# Patient Record
Sex: MALE | Race: BLACK OR AFRICAN AMERICAN | NOT HISPANIC OR LATINO | URBAN - METROPOLITAN AREA
[De-identification: names, ages, dates, MRNs, and addresses within clinical notes are randomized per-mention and may not be internally consistent; named-entity substitution may affect disease eponyms.]

---

## 2019-12-01 ENCOUNTER — EMERGENCY (EMERGENCY)
Facility: HOSPITAL | Age: 49
LOS: 1 days | Discharge: HOME | End: 2019-12-01
Admitting: EMERGENCY MEDICINE
Payer: MEDICAID

## 2019-12-01 VITALS
TEMPERATURE: 98 F | HEART RATE: 96 BPM | DIASTOLIC BLOOD PRESSURE: 78 MMHG | RESPIRATION RATE: 17 BRPM | SYSTOLIC BLOOD PRESSURE: 137 MMHG | WEIGHT: 165.35 LBS | OXYGEN SATURATION: 98 % | HEIGHT: 69 IN

## 2019-12-01 VITALS
TEMPERATURE: 99 F | SYSTOLIC BLOOD PRESSURE: 137 MMHG | RESPIRATION RATE: 16 BRPM | HEART RATE: 97 BPM | DIASTOLIC BLOOD PRESSURE: 81 MMHG | OXYGEN SATURATION: 100 %

## 2019-12-01 DIAGNOSIS — Z90.49 ACQUIRED ABSENCE OF OTHER SPECIFIED PARTS OF DIGESTIVE TRACT: Chronic | ICD-10-CM

## 2019-12-01 LAB
APPEARANCE UR: SIGNIFICANT CHANGE UP
BILIRUB UR-MCNC: NEGATIVE — SIGNIFICANT CHANGE UP
COLOR SPEC: YELLOW — SIGNIFICANT CHANGE UP
DIFF PNL FLD: ABNORMAL
GLUCOSE UR QL: NEGATIVE — SIGNIFICANT CHANGE UP
KETONES UR-MCNC: NEGATIVE — SIGNIFICANT CHANGE UP
LEUKOCYTE ESTERASE UR-ACNC: ABNORMAL
NITRITE UR-MCNC: POSITIVE
PH UR: 6 — SIGNIFICANT CHANGE UP (ref 5–8)
PROT UR-MCNC: ABNORMAL MG/DL
SP GR SPEC: 1.02 — SIGNIFICANT CHANGE UP (ref 1–1.03)
UROBILINOGEN FLD QL: 0.2 E.U./DL — SIGNIFICANT CHANGE UP

## 2019-12-01 PROCEDURE — 99284 EMERGENCY DEPT VISIT MOD MDM: CPT

## 2019-12-01 PROCEDURE — 76870 US EXAM SCROTUM: CPT | Mod: 26

## 2019-12-01 RX ORDER — AZITHROMYCIN 500 MG/1
1000 TABLET, FILM COATED ORAL ONCE
Refills: 0 | Status: COMPLETED | OUTPATIENT
Start: 2019-12-01 | End: 2019-12-01

## 2019-12-01 RX ORDER — CEFPODOXIME PROXETIL 100 MG
1 TABLET ORAL
Qty: 14 | Refills: 0
Start: 2019-12-01 | End: 2019-12-07

## 2019-12-01 RX ORDER — ACETAMINOPHEN 500 MG
650 TABLET ORAL ONCE
Refills: 0 | Status: COMPLETED | OUTPATIENT
Start: 2019-12-01 | End: 2019-12-01

## 2019-12-01 RX ORDER — CEFTRIAXONE 500 MG/1
250 INJECTION, POWDER, FOR SOLUTION INTRAMUSCULAR; INTRAVENOUS ONCE
Refills: 0 | Status: COMPLETED | OUTPATIENT
Start: 2019-12-01 | End: 2019-12-01

## 2019-12-01 RX ADMIN — AZITHROMYCIN 1000 MILLIGRAM(S): 500 TABLET, FILM COATED ORAL at 16:43

## 2019-12-01 RX ADMIN — CEFTRIAXONE 250 MILLIGRAM(S): 500 INJECTION, POWDER, FOR SOLUTION INTRAMUSCULAR; INTRAVENOUS at 16:44

## 2019-12-01 RX ADMIN — Medication 650 MILLIGRAM(S): at 16:43

## 2019-12-01 NOTE — ED ADULT NURSE NOTE - NSIMPLEMENTINTERV_GEN_ALL_ED
Implemented All Universal Safety Interventions:  [place] to call system. Call bell, personal items and telephone within reach. Instruct patient to call for assistance. Room bathroom lighting operational. Non-slip footwear when patient is off stretcher. Physically safe environment: no spills, clutter or unnecessary equipment. Stretcher in lowest position, wheels locked, appropriate side rails in place.

## 2019-12-01 NOTE — ED PROVIDER NOTE - CLINICAL SUMMARY MEDICAL DECISION MAKING FREE TEXT BOX
Pt with R testicular pain and swelling. Will do US, check urine, check for STD and treat for Gonorrhea and Chlamydia.

## 2019-12-01 NOTE — ED ADULT TRIAGE NOTE - CHIEF COMPLAINT QUOTE
patient here with right testicular swelling x3 days, denies discharge or painful urination; also complaining of posterior headaches x1 week; hx HTN and non-compliant with meds

## 2019-12-01 NOTE — ED PROVIDER NOTE - OBJECTIVE STATEMENT
50 y/o M with a PMHx of HTN (on Lisinopril, non-compliant), HIV (on Tivicay), Crohn's dz s/p colectomy,  presents to the ED c/o of R testicular pain x 3 days. Denies dysuria, hematuria, discharge, penile pain, fevers, chills, sweats. Also c/o of a HA x 4 days, described as circumferential and squeezing like, relieved with Tylenol. Also c/o 2 weeks of L buttock pain, admits to heavy lifting and repetitive stress. Has been treated for STDs in the past, treated for Chlamydia and gonorrhea with Cephalexin and for syphilis in the past. MSM, sexually active with multiple partners.

## 2019-12-01 NOTE — ED PROVIDER NOTE - PATIENT PORTAL LINK FT
You can access the FollowMyHealth Patient Portal offered by [facility] by registering at the following website: [email or web address]. By joining [employer]’s FollowMyHealth portal, you will also be able to view your health information using other applications (apps) compatible with our system.

## 2019-12-01 NOTE — ED PROVIDER NOTE - PROVIDER TOKENS
FREE:[[facility]PHONE:[(   )    -],FAX:[(   )    -],ADDRESS:[this week]]

## 2019-12-01 NOTE — ED PROVIDER NOTE - GENITOURINARY, MLM
Palpable lymphadenopathy in R groin, mobile, boggy and tender. Penis uncircumcised, no rash, no lesions, no discharge. R testicle tender, no edema, no erythema. Cremasteric intact B/L.

## 2019-12-02 LAB
C TRACH RRNA SPEC QL NAA+PROBE: SIGNIFICANT CHANGE UP
SPECIMEN SOURCE: SIGNIFICANT CHANGE UP

## 2019-12-06 DIAGNOSIS — N50.811 RIGHT TESTICULAR PAIN: ICD-10-CM

## 2019-12-06 DIAGNOSIS — N45.1 EPIDIDYMITIS: ICD-10-CM

## 2020-02-06 ENCOUNTER — EMERGENCY (EMERGENCY)
Facility: HOSPITAL | Age: 50
LOS: 1 days | Discharge: HOME | End: 2020-02-06
Admitting: EMERGENCY MEDICINE
Payer: MEDICAID

## 2020-02-06 VITALS
DIASTOLIC BLOOD PRESSURE: 78 MMHG | OXYGEN SATURATION: 98 % | TEMPERATURE: 98 F | HEIGHT: 69 IN | HEART RATE: 98 BPM | RESPIRATION RATE: 17 BRPM | WEIGHT: 162.92 LBS | SYSTOLIC BLOOD PRESSURE: 133 MMHG

## 2020-02-06 VITALS
RESPIRATION RATE: 16 BRPM | OXYGEN SATURATION: 99 % | SYSTOLIC BLOOD PRESSURE: 124 MMHG | DIASTOLIC BLOOD PRESSURE: 80 MMHG | HEART RATE: 98 BPM | TEMPERATURE: 99 F

## 2020-02-06 DIAGNOSIS — Z98.890 OTHER SPECIFIED POSTPROCEDURAL STATES: Chronic | ICD-10-CM

## 2020-02-06 DIAGNOSIS — Z90.49 ACQUIRED ABSENCE OF OTHER SPECIFIED PARTS OF DIGESTIVE TRACT: Chronic | ICD-10-CM

## 2020-02-06 PROBLEM — K50.90 CROHN'S DISEASE, UNSPECIFIED, WITHOUT COMPLICATIONS: Chronic | Status: ACTIVE | Noted: 2019-12-01

## 2020-02-06 PROBLEM — B20 HUMAN IMMUNODEFICIENCY VIRUS [HIV] DISEASE: Chronic | Status: ACTIVE | Noted: 2019-12-01

## 2020-02-06 PROBLEM — I10 ESSENTIAL (PRIMARY) HYPERTENSION: Chronic | Status: ACTIVE | Noted: 2019-12-01

## 2020-02-06 PROBLEM — Z00.00 ENCOUNTER FOR PREVENTIVE HEALTH EXAMINATION: Status: ACTIVE | Noted: 2020-02-06

## 2020-02-06 LAB
ALBUMIN SERPL ELPH-MCNC: 3.1 G/DL — LOW (ref 3.4–5)
ALP SERPL-CCNC: 102 U/L — SIGNIFICANT CHANGE UP (ref 40–120)
ALT FLD-CCNC: 61 U/L — HIGH (ref 12–42)
ANION GAP SERPL CALC-SCNC: 9 MMOL/L — SIGNIFICANT CHANGE UP (ref 9–16)
APPEARANCE UR: CLEAR — SIGNIFICANT CHANGE UP
AST SERPL-CCNC: 67 U/L — HIGH (ref 15–37)
BASOPHILS # BLD AUTO: 0.02 K/UL — SIGNIFICANT CHANGE UP (ref 0–0.2)
BASOPHILS NFR BLD AUTO: 0.2 % — SIGNIFICANT CHANGE UP (ref 0–2)
BILIRUB SERPL-MCNC: 0.3 MG/DL — SIGNIFICANT CHANGE UP (ref 0.2–1.2)
BILIRUB UR-MCNC: NEGATIVE — SIGNIFICANT CHANGE UP
BUN SERPL-MCNC: 16 MG/DL — SIGNIFICANT CHANGE UP (ref 7–23)
C TRACH RRNA SPEC QL NAA+PROBE: SIGNIFICANT CHANGE UP
CALCIUM SERPL-MCNC: 9.3 MG/DL — SIGNIFICANT CHANGE UP (ref 8.5–10.5)
CHLORIDE SERPL-SCNC: 99 MMOL/L — SIGNIFICANT CHANGE UP (ref 96–108)
CO2 SERPL-SCNC: 26 MMOL/L — SIGNIFICANT CHANGE UP (ref 22–31)
COLOR SPEC: YELLOW — SIGNIFICANT CHANGE UP
CREAT SERPL-MCNC: 1.38 MG/DL — HIGH (ref 0.5–1.3)
CRP SERPL-MCNC: >12 MG/DL — HIGH (ref 0–0.9)
DIFF PNL FLD: ABNORMAL
EOSINOPHIL # BLD AUTO: 0.14 K/UL — SIGNIFICANT CHANGE UP (ref 0–0.5)
EOSINOPHIL NFR BLD AUTO: 1.2 % — SIGNIFICANT CHANGE UP (ref 0–6)
GLUCOSE SERPL-MCNC: 80 MG/DL — SIGNIFICANT CHANGE UP (ref 70–99)
GLUCOSE UR QL: NEGATIVE — SIGNIFICANT CHANGE UP
HCT VFR BLD CALC: 41.4 % — SIGNIFICANT CHANGE UP (ref 39–50)
HGB BLD-MCNC: 13.2 G/DL — SIGNIFICANT CHANGE UP (ref 13–17)
IMM GRANULOCYTES NFR BLD AUTO: 0.4 % — SIGNIFICANT CHANGE UP (ref 0–1.5)
KETONES UR-MCNC: NEGATIVE — SIGNIFICANT CHANGE UP
LACTATE SERPL-SCNC: 1.8 MMOL/L — SIGNIFICANT CHANGE UP (ref 0.4–2)
LEUKOCYTE ESTERASE UR-ACNC: ABNORMAL
LYMPHOCYTES # BLD AUTO: 1.7 K/UL — SIGNIFICANT CHANGE UP (ref 1–3.3)
LYMPHOCYTES # BLD AUTO: 14.7 % — SIGNIFICANT CHANGE UP (ref 13–44)
MCHC RBC-ENTMCNC: 28.6 PG — SIGNIFICANT CHANGE UP (ref 27–34)
MCHC RBC-ENTMCNC: 31.9 GM/DL — LOW (ref 32–36)
MCV RBC AUTO: 89.6 FL — SIGNIFICANT CHANGE UP (ref 80–100)
MONOCYTES # BLD AUTO: 1.09 K/UL — HIGH (ref 0–0.9)
MONOCYTES NFR BLD AUTO: 9.4 % — SIGNIFICANT CHANGE UP (ref 2–14)
N GONORRHOEA RRNA SPEC QL NAA+PROBE: SIGNIFICANT CHANGE UP
NEUTROPHILS # BLD AUTO: 8.57 K/UL — HIGH (ref 1.8–7.4)
NEUTROPHILS NFR BLD AUTO: 74.1 % — SIGNIFICANT CHANGE UP (ref 43–77)
NITRITE UR-MCNC: POSITIVE
NRBC # BLD: 0 /100 WBCS — SIGNIFICANT CHANGE UP (ref 0–0)
PH UR: 5.5 — SIGNIFICANT CHANGE UP (ref 5–8)
PLATELET # BLD AUTO: 378 K/UL — SIGNIFICANT CHANGE UP (ref 150–400)
POTASSIUM SERPL-MCNC: 4.2 MMOL/L — SIGNIFICANT CHANGE UP (ref 3.5–5.3)
POTASSIUM SERPL-SCNC: 4.2 MMOL/L — SIGNIFICANT CHANGE UP (ref 3.5–5.3)
PROT SERPL-MCNC: 8.6 G/DL — HIGH (ref 6.4–8.2)
PROT UR-MCNC: ABNORMAL MG/DL
RBC # BLD: 4.62 M/UL — SIGNIFICANT CHANGE UP (ref 4.2–5.8)
RBC # FLD: 13.5 % — SIGNIFICANT CHANGE UP (ref 10.3–14.5)
SODIUM SERPL-SCNC: 134 MMOL/L — SIGNIFICANT CHANGE UP (ref 132–145)
SP GR SPEC: 1.02 — SIGNIFICANT CHANGE UP (ref 1–1.03)
SPECIMEN SOURCE: SIGNIFICANT CHANGE UP
T PALLIDUM AB TITR SER: POSITIVE
UROBILINOGEN FLD QL: 1 E.U./DL — SIGNIFICANT CHANGE UP
WBC # BLD: 11.57 K/UL — HIGH (ref 3.8–10.5)
WBC # FLD AUTO: 11.57 K/UL — HIGH (ref 3.8–10.5)

## 2020-02-06 PROCEDURE — 99285 EMERGENCY DEPT VISIT HI MDM: CPT

## 2020-02-06 PROCEDURE — 80053 COMPREHEN METABOLIC PANEL: CPT

## 2020-02-06 PROCEDURE — 36415 COLL VENOUS BLD VENIPUNCTURE: CPT

## 2020-02-06 PROCEDURE — 86592 SYPHILIS TEST NON-TREP QUAL: CPT

## 2020-02-06 PROCEDURE — 86780 TREPONEMA PALLIDUM: CPT

## 2020-02-06 PROCEDURE — 81001 URINALYSIS AUTO W/SCOPE: CPT

## 2020-02-06 PROCEDURE — 96375 TX/PRO/DX INJ NEW DRUG ADDON: CPT

## 2020-02-06 PROCEDURE — 99284 EMERGENCY DEPT VISIT MOD MDM: CPT | Mod: 25

## 2020-02-06 PROCEDURE — 86593 SYPHILIS TEST NON-TREP QUANT: CPT

## 2020-02-06 PROCEDURE — 87086 URINE CULTURE/COLONY COUNT: CPT

## 2020-02-06 PROCEDURE — 72193 CT PELVIS W/DYE: CPT

## 2020-02-06 PROCEDURE — 85025 COMPLETE CBC W/AUTO DIFF WBC: CPT

## 2020-02-06 PROCEDURE — 86140 C-REACTIVE PROTEIN: CPT

## 2020-02-06 PROCEDURE — 87040 BLOOD CULTURE FOR BACTERIA: CPT

## 2020-02-06 PROCEDURE — 87591 N.GONORRHOEAE DNA AMP PROB: CPT

## 2020-02-06 PROCEDURE — 87491 CHLMYD TRACH DNA AMP PROBE: CPT

## 2020-02-06 PROCEDURE — 83605 ASSAY OF LACTIC ACID: CPT

## 2020-02-06 PROCEDURE — 87186 SC STD MICRODIL/AGAR DIL: CPT

## 2020-02-06 PROCEDURE — 72193 CT PELVIS W/DYE: CPT | Mod: 26

## 2020-02-06 PROCEDURE — 96374 THER/PROPH/DIAG INJ IV PUSH: CPT | Mod: XU

## 2020-02-06 RX ORDER — CEFTRIAXONE 500 MG/1
1000 INJECTION, POWDER, FOR SOLUTION INTRAMUSCULAR; INTRAVENOUS ONCE
Refills: 0 | Status: COMPLETED | OUTPATIENT
Start: 2020-02-06 | End: 2020-02-06

## 2020-02-06 RX ORDER — ACETAMINOPHEN 500 MG
2 TABLET ORAL
Qty: 20 | Refills: 0
Start: 2020-02-06

## 2020-02-06 RX ORDER — AZITHROMYCIN 500 MG/1
1000 TABLET, FILM COATED ORAL ONCE
Refills: 0 | Status: COMPLETED | OUTPATIENT
Start: 2020-02-06 | End: 2020-02-06

## 2020-02-06 RX ORDER — CIPROFLOXACIN LACTATE 400MG/40ML
1 VIAL (ML) INTRAVENOUS
Qty: 10 | Refills: 0
Start: 2020-02-06 | End: 2020-02-15

## 2020-02-06 RX ORDER — ACETAMINOPHEN 500 MG
975 TABLET ORAL ONCE
Refills: 0 | Status: COMPLETED | OUTPATIENT
Start: 2020-02-06 | End: 2020-02-06

## 2020-02-06 RX ORDER — KETOROLAC TROMETHAMINE 30 MG/ML
30 SYRINGE (ML) INJECTION ONCE
Refills: 0 | Status: DISCONTINUED | OUTPATIENT
Start: 2020-02-06 | End: 2020-02-06

## 2020-02-06 RX ORDER — IBUPROFEN 200 MG
1 TABLET ORAL
Qty: 20 | Refills: 0
Start: 2020-02-06 | End: 2020-03-06

## 2020-02-06 RX ADMIN — Medication 975 MILLIGRAM(S): at 03:02

## 2020-02-06 RX ADMIN — Medication 30 MILLIGRAM(S): at 03:01

## 2020-02-06 RX ADMIN — AZITHROMYCIN 1000 MILLIGRAM(S): 500 TABLET, FILM COATED ORAL at 03:02

## 2020-02-06 RX ADMIN — CEFTRIAXONE 100 MILLIGRAM(S): 500 INJECTION, POWDER, FOR SOLUTION INTRAMUSCULAR; INTRAVENOUS at 03:02

## 2020-02-06 RX ADMIN — Medication 100 MILLIGRAM(S): at 03:03

## 2020-02-06 NOTE — ED PROVIDER NOTE - PATIENT PORTAL LINK FT
You can access the FollowMyHealth Patient Portal offered by [facility] by registering at the following website: [email or web address]. By joining [email or web address]’s FollowMyHealth portal, you will also be able to view your health information using other applications (apps) compatible with our system.

## 2020-02-06 NOTE — ED PROVIDER NOTE - CARE PROVIDER_API CALL
Vishnu Capone)  Urology  [address], [place] [address]  Phone: [phone]  Fax: [phone]  Follow Up Time:     please return to the ER today between 8am-8pm for re-evaluation and ultrasound,   Phone: (   )    -  Fax: (   )    -  Follow Up Time:

## 2020-02-06 NOTE — ED PROVIDER NOTE - OBJECTIVE STATEMENT
48 yo M, MSM, with PMHx of HIV, last CD4 unknown, VL undetectable, on HAART, compliant with meds, epididymitis of the R in the past, GC/CG/syphilis s/p treatment, HTN, crohn's, bronchitis, presenting c/o L testicular pain x 4d.  Pt reports gradual onset of pain in the L testicular region with associated scrotal swelling and chills.  Noted worsening pain in the past day with difficulty ambulating.  Reports similar sx in the past due to R epididymitis, s/p tx in the ED.  Last sexual intercourse with single male partner 2 weeks ago without protection noted.  Denies fever, hematuria, penile d/c, ulcers, rash, abdominal pain, change in bowel function, flank pain, HA, dizziness, SOB, CP, palpitations, diaphoresis, cough, trauma, fall, and malaise.

## 2020-02-06 NOTE — ED PROVIDER NOTE - NSFOLLOWUPINSTRUCTIONS_ED_ALL_ED_FT
Epididymitis  Image   Epididymitis is swelling (inflammation) of the epididymis. The epididymis is a cord-like structure that is located along the top and back part of the testicle. It collects and stores sperm from the testicle.  This condition can also cause pain and swelling of the testicle and scrotum. Symptoms usually start suddenly (acute epididymitis). Sometimes epididymitis starts gradually and lasts for a while (chronic epididymitis). This type may be harder to treat.  What are the causes?  In men 35 and younger, this condition is usually caused by a bacterial infection or sexually transmitted disease (STD), such as:  Gonorrhea.Chlamydia.In men 35 and older who do not have anal sex, this condition is usually caused by bacteria from a blockage or abnormalities in the urinary system. These can result from:  Having a tube placed into the bladder (urinary catheter).Having an enlarged or inflamed prostate gland.Having recent urinary tract surgery.In men who have a condition that weakens the body’s defense system (immune system), such as HIV, this condition can be caused by:  Other bacteria, including tuberculosis and syphilis.Viruses.Fungi.Sometimes this condition occurs without infection. That may happen if urine flows backward into the epididymis after heavy lifting or straining.  What increases the risk?  This condition is more likely to develop in men:  Who have unprotected sex with more than one partner.Who have anal sex.Who have recently had surgery.Who have a urinary catheter.Who have urinary problems.Who have a suppressed immune system.What are the signs or symptoms?  This condition usually begins suddenly with chills, fever, and pain behind the scrotum and in the testicle. Other symptoms include:  Swelling of the scrotum, testicle, or both.Pain when ejaculating or urinating.Pain in the back or belly.Nausea.Itching and discharge from the penis.Frequent need to pass urine.Redness and tenderness of the scrotum.How is this diagnosed?  Your health care provider can diagnose this condition based on your symptoms and medical history. Your health care provider will also do a physical exam to ask about your symptoms and check your scrotum and testicle for swelling, pain, and redness. You may also have other tests, including:  Examination of discharge from the penis.Urine tests for infections, such as STDs.Your health care provider may test you for other STDs, including HIV.  How is this treated?  Treatment for this condition depends on the cause. If your condition is caused by a bacterial infection, oral antibiotic medicine may be prescribed. If the bacterial infection has spread to your blood, you may need to receive IV antibiotics. Nonbacterial epididymitis is treated with home care that includes bed rest and elevation of the scrotum.  Surgery may be needed to treat:  Bacterial epididymitis that causes pus to build up in the scrotum (abscess).Chronic epididymitis that has not responded to other treatments.Follow these instructions at home:  Medicines     Take over-the-counter and prescription medicines only as told by your health care provider.If you were prescribed an antibiotic medicine, take it as told by your health care provider. Do not stop taking the antibiotic even if your condition improves.Sexual Activity     If your epididymitis was caused by an STD, avoid sexual activity until your treatment is complete.Inform your sexual partner or partners if you test positive for an STD. They may need to be treated. Do not engage in sexual activity with your partner or partners until their treatment is completed.General instructions     Return to your normal activities as told by your health care provider. Ask your health care provider what activities are safe for you.Keep your scrotum elevated and supported while resting. Ask your health care provider if you should wear a scrotal support, such as a jockstrap. Wear it as told by your health care provider.If directed, apply ice to the affected area:  Put ice in a plastic bag.Place a towel between your skin and the bag.Leave the ice on for 20 minutes, 2–3 times per day.Try taking a sitz bath to help with discomfort. This is a warm water bath that is taken while you are sitting down. The water should only come up to your hips and should cover your buttocks. Do this 3–4 times per day or as told by your health care provider.Keep all follow-up visits as told by your health care provider. This is important.Contact a health care provider if:  You have a fever.Your pain medicine is not helping.Your pain is getting worse.Your symptoms do not improve within three days. Epididymitis    Epididymitis is swelling (inflammation) of the epididymis. The epididymis is a cord-like structure that is located along the top and back part of the testicle. It collects and stores sperm from the testicle.  This condition can also cause pain and swelling of the testicle and scrotum. Symptoms usually start suddenly (acute epididymitis). Sometimes epididymitis starts gradually and lasts for a while (chronic epididymitis). This type may be harder to treat.  What are the causes?  In men 35 and younger, this condition is usually caused by a bacterial infection or sexually transmitted disease (STD), such as:  Gonorrhea.Chlamydia.In men 35 and older who do not have anal sex, this condition is usually caused by bacteria from a blockage or abnormalities in the urinary system. These can result from:  Having a tube placed into the bladder (urinary catheter).Having an enlarged or inflamed prostate gland.Having recent urinary tract surgery.In men who have a condition that weakens the body’s defense system (immune system), such as HIV, this condition can be caused by:  Other bacteria, including tuberculosis and syphilis.Viruses.Fungi.Sometimes this condition occurs without infection. That may happen if urine flows backward into the epididymis after heavy lifting or straining.  What increases the risk?  This condition is more likely to develop in men:  Who have unprotected sex with more than one partner.Who have anal sex.Who have recently had surgery.Who have a urinary catheter.Who have urinary problems.Who have a suppressed immune system.What are the signs or symptoms?  This condition usually begins suddenly with chills, fever, and pain behind the scrotum and in the testicle. Other symptoms include:  Swelling of the scrotum, testicle, or both.Pain when ejaculating or urinating.Pain in the back or belly.Nausea.Itching and discharge from the penis.Frequent need to pass urine.Redness and tenderness of the scrotum.How is this diagnosed?  Your health care provider can diagnose this condition based on your symptoms and medical history. Your health care provider will also do a physical exam to ask about your symptoms and check your scrotum and testicle for swelling, pain, and redness. You may also have other tests, including:  Examination of discharge from the penis.Urine tests for infections, such as STDs.Your health care provider may test you for other STDs, including HIV.  How is this treated?  Treatment for this condition depends on the cause. If your condition is caused by a bacterial infection, oral antibiotic medicine may be prescribed. If the bacterial infection has spread to your blood, you may need to receive IV antibiotics. Nonbacterial epididymitis is treated with home care that includes bed rest and elevation of the scrotum.  Surgery may be needed to treat:  Bacterial epididymitis that causes pus to build up in the scrotum (abscess).Chronic epididymitis that has not responded to other treatments.Follow these instructions at home:  Medicines     Take over-the-counter and prescription medicines only as told by your health care provider.If you were prescribed an antibiotic medicine, take it as told by your health care provider. Do not stop taking the antibiotic even if your condition improves.Sexual Activity     If your epididymitis was caused by an STD, avoid sexual activity until your treatment is complete.Inform your sexual partner or partners if you test positive for an STD. They may need to be treated. Do not engage in sexual activity with your partner or partners until their treatment is completed.General instructions     Return to your normal activities as told by your health care provider. Ask your health care provider what activities are safe for you.Keep your scrotum elevated and supported while resting. Ask your health care provider if you should wear a scrotal support, such as a jockstrap. Wear it as told by your health care provider.If directed, apply ice to the affected area:  Put ice in a plastic bag.Place a towel between your skin and the bag.Leave the ice on for 20 minutes, 2–3 times per day.Try taking a sitz bath to help with discomfort. This is a warm water bath that is taken while you are sitting down. The water should only come up to your hips and should cover your buttocks. Do this 3–4 times per day or as told by your health care provider.Keep all follow-up visits as told by your health care provider. This is important.Contact a health care provider if:  You have a fever.Your pain medicine is not helping.Your pain is getting worse.Your symptoms do not improve within three days.

## 2020-02-06 NOTE — ED PROVIDER NOTE - PHYSICAL EXAMINATION
Vital Signs - nursing notes reviewed and confirmed  Gen - WDWN M, NAD, comfortable and non-toxic appearing, speaking in full sentences   Skin - warm, dry, intact  HEENT - AT/NC, PERRL, EOMI, no conjunctival injection, moist oral mucosa, TM intact b/l with good cone of lights, o/p clear with no erythema, edema, or exudate, uvula midline, airway patent, neck supple and NT, FROM, no palpable nodes  CV - S1S2, R/R/R  Resp - respiration non-labored, CTAB, symmetric bs b/l, no r/r/w  GI - NABS, soft, ND, NT, no rebound or guarding, no CVAT b/l    - external genitalia wnl, +L testicular region with mild generalized edema, erythema, and firmness to palpation with TTP, no crepitus, ecchymosis, retractable prepuce, no penile d/c, bleeding, rash, or ulceration noted, cremasteric reflex intact b/l, +prehn sign on the L   MS - w/w/p, no c/c/e, calves supple and NT, distal pulses symmetric b/l, brisk cap refills, +SILT  Neuro - AxOx3, no focal neuro deficits, ambulatory with mild limp 2/2 pain Vital Signs - nursing notes reviewed and confirmed  Gen - WDWN M, NAD, comfortable and non-toxic appearing, speaking in full sentences   Skin - warm, dry, intact  HEENT - AT/NC, PERRL, EOMI, no conjunctival injection, moist oral mucosa, TM intact b/l with good cone of lights, o/p clear with no erythema, edema, or exudate, uvula midline, airway patent, neck supple and NT, FROM, no palpable nodes  CV - S1S2, R/R/R  Resp - respiration non-labored, CTAB, symmetric bs b/l, no r/r/w  GI - NABS, soft, ND, NT, no rebound or guarding, no CVAT b/l    - external genitalia wnl, +L testicular region with mild generalized edema, erythema, and firmness to palpation with TTP, normal lie of the testicles, no crepitus, ecchymosis, retractable prepuce, no penile d/c, bleeding, rash, or ulceration noted, cremasteric reflex intact b/l, +prehn sign on the L   MS - w/w/p, no c/c/e, calves supple and NT, distal pulses symmetric b/l, brisk cap refills, +SILT  Neuro - AxOx3, no focal neuro deficits, ambulatory with mild limp 2/2 pain

## 2020-02-06 NOTE — ED PROVIDER NOTE - CARE PLAN
Principal Discharge DX:	Epididymitis  Secondary Diagnosis:	UTI (urinary tract infection)  Secondary Diagnosis:	Hydrocele in adult

## 2020-02-06 NOTE — ED ADULT TRIAGE NOTE - CHIEF COMPLAINT QUOTE
Left testicular pain and swelling x4 days. PT reports history of epidymidis. Denies  symptoms. Denies fever or chills.

## 2020-02-06 NOTE — ED ADULT NURSE NOTE - OBJECTIVE STATEMENT
49y male presents to ED R testicular pain. Hx of Chrons and epididymitis, endorses pain beginning 4 hours PTA. No n/v/d, cp, sob, fevers, chills.

## 2020-02-06 NOTE — ED PROVIDER NOTE - PROVIDER TOKENS
PROVIDER:[TOKEN:[[id number]]],FREE:[LAST:[please return to the ER today between 8am-8pm for re-evaluation and ultrasound],PHONE:[(   )    -],FAX:[(   )    -]]

## 2020-02-06 NOTE — ED PROVIDER NOTE - CLINICAL SUMMARY MEDICAL DECISION MAKING FREE TEXT BOX
AFVSS at time of d/c, pt non-toxic appearing, results, ddx, and f/u plans discussed with pt at bedside, d/c'd home to f/u with PMD, strict return precautions discussed, prompt return to ER for any worsening or new sx, pt verbalized understanding. pt p/w progressive worsening L sided testicular pain x 4-5d, afebrile and nontoxic appearing in the ED, exam with normal lie of the testicular and +prehn sign, slightly warm and erythematous/edematous appearing, no focal fluctuance, labs with mild leukocytosis to 11K, U/A positive, pt also with risky unprotected insertive sexual practices 2 wks ago, sx highly suggestive of epididymitis, STD panel obtained and empirically covered for GC/CG/syphilis, CT with no abscess noted, s/p dose of IV ceftriaxone/azithro/doxy in the ED, pain improved and pt well appearing, given scrotal support, AFVSS at time of d/c, pt non-toxic appearing, results, ddx, and f/u plans discussed with pt at bedside, d/c'd home to f/u with urology, and course of doxy and levaquin, return in the am for US and re-evaluation, strict return precautions discussed, prompt return to ER sooner for any worsening or new sx, pt verbalized understanding.

## 2020-02-06 NOTE — ED PROVIDER NOTE - DISPOSITION TYPE
DISCHARGE W Plasty Text: The lesion was extirpated to the level of the fat with a #15 scalpel blade.  Given the location of the defect, shape of the defect and the proximity to free margins a W-plasty was deemed most appropriate for repair.  Using a sterile surgical marker, the appropriate transposition arms of the W-plasty were drawn incorporating the defect and placing the expected incisions within the relaxed skin tension lines where possible.    The area thus outlined was incised deep to adipose tissue with a #15 scalpel blade.  The skin margins were undermined to an appropriate distance in all directions utilizing iris scissors.  The opposing transposition arms were then transposed into place in opposite direction and anchored with interrupted buried subcutaneous sutures.

## 2020-02-06 NOTE — ED PROVIDER NOTE - PMH
Bronchitis    Chlamydia    Crohn's disease    Gonorrhea    HIV (human immunodeficiency virus infection)    HTN (hypertension)    Syphilis

## 2020-02-07 NOTE — ED POST DISCHARGE NOTE - DETAILS
Pt also has + Urine cultures for Klebsiella Pneumoniae- pan sensitive.  Ceftin Rx sent.  2/9/20 @ 12:48 pm, DEBBIE 2/10/20 @ 10:26 am DEBBIE THU Mccarty-Left  - Certified letter requested

## 2020-02-09 RX ORDER — CEFUROXIME AXETIL 250 MG
1 TABLET ORAL
Qty: 14 | Refills: 0
Start: 2020-02-09 | End: 2020-02-15

## 2020-02-10 DIAGNOSIS — N39.0 URINARY TRACT INFECTION, SITE NOT SPECIFIED: ICD-10-CM

## 2020-02-10 DIAGNOSIS — N45.1 EPIDIDYMITIS: ICD-10-CM

## 2020-02-10 DIAGNOSIS — N43.3 HYDROCELE, UNSPECIFIED: ICD-10-CM

## 2020-02-10 DIAGNOSIS — N50.812 LEFT TESTICULAR PAIN: ICD-10-CM

## 2020-02-11 LAB
CULTURE RESULTS: SIGNIFICANT CHANGE UP
CULTURE RESULTS: SIGNIFICANT CHANGE UP
SPECIMEN SOURCE: SIGNIFICANT CHANGE UP
SPECIMEN SOURCE: SIGNIFICANT CHANGE UP

## 2022-01-02 ENCOUNTER — EMERGENCY (EMERGENCY)
Facility: HOSPITAL | Age: 52
LOS: 1 days | Discharge: HOME | End: 2022-01-02
Admitting: EMERGENCY MEDICINE
Payer: MEDICAID

## 2022-01-02 ENCOUNTER — EMERGENCY (EMERGENCY)
Facility: HOSPITAL | Age: 52
LOS: 1 days | Discharge: ANOTHER HOSPITAL | End: 2022-01-02
Attending: EMERGENCY MEDICINE | Admitting: EMERGENCY MEDICINE
Payer: MEDICAID

## 2022-01-02 VITALS
HEART RATE: 90 BPM | SYSTOLIC BLOOD PRESSURE: 130 MMHG | RESPIRATION RATE: 18 BRPM | DIASTOLIC BLOOD PRESSURE: 80 MMHG | TEMPERATURE: 98 F | OXYGEN SATURATION: 99 %

## 2022-01-02 VITALS
HEART RATE: 97 BPM | SYSTOLIC BLOOD PRESSURE: 104 MMHG | WEIGHT: 179.9 LBS | TEMPERATURE: 98 F | DIASTOLIC BLOOD PRESSURE: 57 MMHG | OXYGEN SATURATION: 99 % | RESPIRATION RATE: 20 BRPM | HEIGHT: 69 IN

## 2022-01-02 VITALS
SYSTOLIC BLOOD PRESSURE: 136 MMHG | RESPIRATION RATE: 18 BRPM | OXYGEN SATURATION: 98 % | DIASTOLIC BLOOD PRESSURE: 92 MMHG | TEMPERATURE: 98 F | HEART RATE: 75 BPM

## 2022-01-02 VITALS
SYSTOLIC BLOOD PRESSURE: 147 MMHG | HEIGHT: 69 IN | WEIGHT: 164.91 LBS | OXYGEN SATURATION: 98 % | TEMPERATURE: 98 F | RESPIRATION RATE: 18 BRPM | DIASTOLIC BLOOD PRESSURE: 85 MMHG | HEART RATE: 80 BPM

## 2022-01-02 DIAGNOSIS — U07.1 COVID-19: ICD-10-CM
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
## 2022-01-02 DIAGNOSIS — M25.561 PAIN IN RIGHT KNEE: ICD-10-CM

## 2022-01-02 DIAGNOSIS — Z90.49 ACQUIRED ABSENCE OF OTHER SPECIFIED PARTS OF DIGESTIVE TRACT: Chronic | ICD-10-CM

## 2022-01-02 DIAGNOSIS — I10 ESSENTIAL (PRIMARY) HYPERTENSION: ICD-10-CM

## 2022-01-02 DIAGNOSIS — M25.461 EFFUSION, RIGHT KNEE: ICD-10-CM

## 2022-01-02 DIAGNOSIS — Z21 ASYMPTOMATIC HUMAN IMMUNODEFICIENCY VIRUS [HIV] INFECTION STATUS: ICD-10-CM
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
## 2022-01-02 DIAGNOSIS — Z98.890 OTHER SPECIFIED POSTPROCEDURAL STATES: Chronic | ICD-10-CM

## 2022-01-02 PROBLEM — A54.9 GONOCOCCAL INFECTION, UNSPECIFIED: Chronic | Status: ACTIVE | Noted: 2020-02-06

## 2022-01-02 PROBLEM — J40 BRONCHITIS, NOT SPECIFIED AS ACUTE OR CHRONIC: Chronic | Status: ACTIVE | Noted: 2020-02-06

## 2022-01-02 PROBLEM — A53.9 SYPHILIS, UNSPECIFIED: Chronic | Status: ACTIVE | Noted: 2020-02-06

## 2022-01-02 PROBLEM — A74.9 CHLAMYDIAL INFECTION, UNSPECIFIED: Chronic | Status: ACTIVE | Noted: 2020-02-06

## 2022-01-02 LAB
ALBUMIN SERPL ELPH-MCNC: 3.5 G/DL — SIGNIFICANT CHANGE UP (ref 3.4–5)
ALP SERPL-CCNC: 82 U/L — SIGNIFICANT CHANGE UP (ref 40–120)
ALT FLD-CCNC: 30 U/L — SIGNIFICANT CHANGE UP (ref 12–42)
ANION GAP SERPL CALC-SCNC: 6 MMOL/L — LOW (ref 9–16)
AST SERPL-CCNC: 32 U/L — SIGNIFICANT CHANGE UP (ref 15–37)
BASOPHILS # BLD AUTO: 0.02 K/UL — SIGNIFICANT CHANGE UP (ref 0–0.2)
BASOPHILS NFR BLD AUTO: 0.2 % — SIGNIFICANT CHANGE UP (ref 0–2)
BILIRUB SERPL-MCNC: 0.2 MG/DL — SIGNIFICANT CHANGE UP (ref 0.2–1.2)
BUN SERPL-MCNC: 24 MG/DL — HIGH (ref 7–23)
CALCIUM SERPL-MCNC: 8.6 MG/DL — SIGNIFICANT CHANGE UP (ref 8.5–10.5)
CHLORIDE SERPL-SCNC: 101 MMOL/L — SIGNIFICANT CHANGE UP (ref 96–108)
CO2 SERPL-SCNC: 29 MMOL/L — SIGNIFICANT CHANGE UP (ref 22–31)
CREAT SERPL-MCNC: 1.7 MG/DL — HIGH (ref 0.5–1.3)
CRP SERPL-MCNC: 62 MG/L — HIGH (ref 0–9)
EOSINOPHIL # BLD AUTO: 0.28 K/UL — SIGNIFICANT CHANGE UP (ref 0–0.5)
EOSINOPHIL NFR BLD AUTO: 3.3 % — SIGNIFICANT CHANGE UP (ref 0–6)
ERYTHROCYTE [SEDIMENTATION RATE] IN BLOOD: 25 MM/HR — HIGH
GLUCOSE SERPL-MCNC: 86 MG/DL — SIGNIFICANT CHANGE UP (ref 70–99)
HCT VFR BLD CALC: 37.7 % — LOW (ref 39–50)
HGB BLD-MCNC: 11.9 G/DL — LOW (ref 13–17)
IMM GRANULOCYTES NFR BLD AUTO: 0.4 % — SIGNIFICANT CHANGE UP (ref 0–1.5)
LYMPHOCYTES # BLD AUTO: 1.27 K/UL — SIGNIFICANT CHANGE UP (ref 1–3.3)
LYMPHOCYTES # BLD AUTO: 15.1 % — SIGNIFICANT CHANGE UP (ref 13–44)
MCHC RBC-ENTMCNC: 29 PG — SIGNIFICANT CHANGE UP (ref 27–34)
MCHC RBC-ENTMCNC: 31.6 GM/DL — LOW (ref 32–36)
MCV RBC AUTO: 92 FL — SIGNIFICANT CHANGE UP (ref 80–100)
MONOCYTES # BLD AUTO: 0.93 K/UL — HIGH (ref 0–0.9)
MONOCYTES NFR BLD AUTO: 11.1 % — SIGNIFICANT CHANGE UP (ref 2–14)
NEUTROPHILS # BLD AUTO: 5.88 K/UL — SIGNIFICANT CHANGE UP (ref 1.8–7.4)
NEUTROPHILS NFR BLD AUTO: 69.9 % — SIGNIFICANT CHANGE UP (ref 43–77)
NRBC # BLD: 0 /100 WBCS — SIGNIFICANT CHANGE UP (ref 0–0)
PLATELET # BLD AUTO: 327 K/UL — SIGNIFICANT CHANGE UP (ref 150–400)
POTASSIUM SERPL-MCNC: 4.8 MMOL/L — SIGNIFICANT CHANGE UP (ref 3.5–5.3)
POTASSIUM SERPL-SCNC: 4.8 MMOL/L — SIGNIFICANT CHANGE UP (ref 3.5–5.3)
PROT SERPL-MCNC: 7.6 G/DL — SIGNIFICANT CHANGE UP (ref 6.4–8.2)
RBC # BLD: 4.1 M/UL — LOW (ref 4.2–5.8)
RBC # FLD: 14.2 % — SIGNIFICANT CHANGE UP (ref 10.3–14.5)
SARS-COV-2 RNA SPEC QL NAA+PROBE: DETECTED
SODIUM SERPL-SCNC: 136 MMOL/L — SIGNIFICANT CHANGE UP (ref 132–145)
WBC # BLD: 8.41 K/UL — SIGNIFICANT CHANGE UP (ref 3.8–10.5)
WBC # FLD AUTO: 8.41 K/UL — SIGNIFICANT CHANGE UP (ref 3.8–10.5)

## 2022-01-02 PROCEDURE — 99284 EMERGENCY DEPT VISIT MOD MDM: CPT | Mod: 25

## 2022-01-02 PROCEDURE — 73562 X-RAY EXAM OF KNEE 3: CPT | Mod: 26,RT

## 2022-01-02 PROCEDURE — 99284 EMERGENCY DEPT VISIT MOD MDM: CPT

## 2022-01-02 PROCEDURE — 85652 RBC SED RATE AUTOMATED: CPT

## 2022-01-02 PROCEDURE — 36415 COLL VENOUS BLD VENIPUNCTURE: CPT

## 2022-01-02 RX ORDER — ACETAMINOPHEN 500 MG
650 TABLET ORAL ONCE
Refills: 0 | Status: COMPLETED | OUTPATIENT
Start: 2022-01-02 | End: 2022-01-02

## 2022-01-02 RX ORDER — AZTREONAM 2 G
1 VIAL (EA) INJECTION
Qty: 20 | Refills: 0
Start: 2022-01-02 | End: 2022-01-11

## 2022-01-02 RX ADMIN — Medication 1 TABLET(S): at 13:42

## 2022-01-02 RX ADMIN — Medication 650 MILLIGRAM(S): at 02:59

## 2022-01-02 NOTE — ED PROVIDER NOTE - ATTENDING CONTRIBUTION TO CARE
Pt seen by me and I agree with A&P.  Needs ortho consult & likely arthrocentesis to R/O septic arthritis.

## 2022-01-02 NOTE — ED PROVIDER NOTE - PHYSICAL EXAMINATION
Gen: well appearing, no acute distress  Skin: warm/dry  Resp: breathing comfortably, speaking in full sentences, no dyspnea  RLE: minimal swelling and warmth over knee and superficial wound w/ ttp over wound, no discharge or palpable fb, no fluctuance, pain w/ ROM knee but not limited, no calf ttp/edema, DP/PT pulse 2+, distal SILT  Neuro: alert/oriented, ambulatory

## 2022-01-02 NOTE — ED ADULT NURSE NOTE - DISCHARGE DATE/TIME
[FreeTextEntry1] : \par Dear Dr. Browne,\par She is referred by her daughter to me for evaluation of her various cardiac symptoms.  She is a 47-year-old longtime smoker has been having various symptoms over the past month approximately 1 month ago she awoke with dizziness headaches nausea and sharp chest pain and was seen in the [facility] emergency room work-up was negative and she was sent home on meclizine which she did not take.  Since that time she has intermittent episodes of left sided parasternal sharp chest pain that occur sporadically and not associated with any particular exertion or position.\par She also notes episodes of brief fast heartbeats that resolved spontaneously after a few seconds.  There is some associated shortness of breath and these episodes occur almost daily.\par She does report persistent intermittent headaches for which she takes Tylenol with limited success.  She does not perform any routine aerobic exercise but does report some exertional shortness of breath when walking upstairs.  She works as a masseuse.\par She has no history of coronary artery disease, diabetes mellitus, hypercholesterolemia, hypertension or family history of premature coronary artery disease.  She does smoke a pack of cigarettes a day. She takes birth control pills and has a history of varicose veins.
02-Jan-2022 14:22

## 2022-01-02 NOTE — ED PROVIDER NOTE - CLINICAL SUMMARY MEDICAL DECISION MAKING FREE TEXT BOX
right knee pain and swelling: arthritis likely, septic joint on differential however, ROM not limited on exam and afebrile. pending labs right knee pain and swelling: arthritis likely, septic joint on differential however, ROM not limited on exam and afebrile. pending labs  CRP 62, wbc normal & afebrile - TXF to  ER for ortho consultation (Dr. Winter aware)

## 2022-01-02 NOTE — ED PROVIDER NOTE - OBJECTIVE STATEMENT
51 M pmh HIV on haart, htn transfer from [facility] for R knee pain s/p injury.  accidentally knelt down onto glass sustaining wound over patella, now w/ swelling and pain to knee and purulent dc from wound since yesterday.  Seen at [facility], xray no FB and elevated crp 62.  As per note recommended transfer by ortho Dr. Winter.  Denies f/c, headache, dizziness, chest pain, sob, nv, numbness/weakness, limited ROM joints, calf pain/swelling, other complaints

## 2022-01-02 NOTE — ED PROVIDER NOTE - CLINICAL SUMMARY MEDICAL DECISION MAKING FREE TEXT BOX
51 M pmh HIV on haart, htn transfer from [facility] for R knee pain s/p injury.  on exam RLE: minimal swelling and warmth over knee and superficial wound w/ ttp over wound, no discharge or palpable fb, no fluctuance, pain w/ ROM knee but not limited, no calf ttp/edema, DP/PT pulse 2+, distal SILT.  xrays/labs reviewed from [facility], will c/s ortho 51 M pmh HIV on haart, htn transfer from [facility] for R knee pain s/p injury.  on exam RLE: minimal swelling and warmth over knee and superficial wound w/ ttp over wound, no discharge or palpable fb, no fluctuance, pain w/ ROM knee but not limited, no calf ttp/edema, DP/PT pulse 2+, distal SILT.  xrays/labs reviewed from [facility], will c/s ortho    ortho eval, recommend bactrim PO and outpt f/u with Dr. Winter.  discussed strict return parameters

## 2022-01-02 NOTE — ED ADULT NURSE NOTE - NSIMPLEMENTINTERV_GEN_ALL_ED
Implemented All Universal Safety Interventions:  [place] to call system. Call bell, personal items and telephone within reach. Instruct patient to call for assistance. Room bathroom lighting operational. Non-slip footwear when patient is off stretcher. Physically safe environment: no spills, clutter or unnecessary equipment. Stretcher in lowest position, wheels locked, appropriate side rails in place.

## 2022-01-02 NOTE — ED PROVIDER NOTE - NSFOLLOWUPINSTRUCTIONS_ED_ALL_ED_FT
Take full course of antibiotics as prescribed.  Call to arrange follow up with orthopedic specialist within one week    You were also covid positive, which means  you need to isolate yourself at home for FIVE (5) days from onset of symptoms or from date of test if asymptomatic if your covid result is positive.  You should isolate pending results.  Your results will not come back for approximately 72 hours  If you have no symptoms or symptoms are improving after day 5 you can leave your house, but must wear a mask when around others for 5 more days.  If you have fevers after 5 days you must continue isolation until fever resolves    Take tylenol 650mg or motrin 400-800mg as needed every 4-6 hours for pain.   REST- Rest your hurting/injured joint or extremity to decrease pain and swelling for 24-48 hours    ICE- Apply ice to area of pain to decreased inflammation and pain, put towel/barrier between ice and skin. You can keep ice on for 20 minutes at a time 4-8 times daily   COMPRESSION- Wear ace wrap or brace for support to reduce swelling.  Make sure not to wrap too tight, loosen if skin feeling numb/tingling or skin turns blue   ELEVATION- Elevate hurting/injured area 6 or more inches about level of heart to decrease swelling/inflammation.  Use pillow under joint to elevate area    Return to ED if you have fever, increased pain/swelling, purulent discharge from wound, unable to move the knee or other concerns    Knee Bursitis    WHAT YOU NEED TO KNOW:    Knee bursitis is inflammation of the bursa in your knee. The bursa is a fluid-filled sac that acts as a cushion between a bone and a tendon. A tendon is a cord of strong tissue that connects muscles to bones.    Knee Anatomy          DISCHARGE INSTRUCTIONS:    Call your doctor if:   •Your pain and swelling increase.      •Your symptoms do not improve with treatment.      •You have a fever.      •You have questions or concerns about your condition or care.      Medicines: You may need any of the following:   •NSAIDs, such as ibuprofen, help decrease swelling, pain, and fever. This medicine is available with or without a doctor's order. NSAIDs can cause stomach bleeding or kidney problems in certain people. If you take blood thinner medicine, always ask if NSAIDs are safe for you. Always read the medicine label and follow directions. Do not give these medicines to children under 6 months of age without direction from your child's healthcare provider.      •Antibiotics help fight an infection caused by bacteria.      •Steroids may be given for a short time to relieve pain and swelling.      •Take your medicine as directed. Contact your healthcare provider if you think your medicine is not helping or if you have side effects. Tell him of her if you are allergic to any medicine. Keep a list of the medicines, vitamins, and herbs you take. Include the amounts, and when and why you take them. Bring the list or the pill bottles to follow-up visits. Carry your medicine list with you in case of an emergency.      Manage your symptoms:   •Rest your knee as much as possible to decrease pain and swelling. Slowly start to do more each day. Return to your daily activities as directed.      •Apply ice to help decrease swelling and pain. Ice may also help prevent tissue damage. Use an ice pack, or put crushed ice in a plastic bag. Cover it with a towel and place it on your knee for 15 to 20 minutes, 3 to 4 times each day, as directed.      •Apply heat to help decrease pain and stiffness. Apply heat on the area for 15 to 20 minutes, 3 to 4 times each day, as directed.      •Apply compression to decrease swelling. Healthcare providers may wrap your knee with tape or an elastic bandage to decrease swelling. Loosen the elastic bandage if you start to lose feeling in your toes.  How to Wrap an Elastic Bandage           •Elevate your knee above the level of your heart as often as you can. This will help decrease swelling and pain. Prop your lower leg on pillows or blankets to keep it elevated comfortably. Do not put the pillow directly under your knee.  Elevate Leg           •Go to physical therapy, if directed. A physical therapist can teach you exercises to improve your range of motion and increase knee strength.      Prevent knee bursitis:   •Stretch, warm up, and cool down when you exercise. This will help loosen your muscles and decrease stress on your knees. Rest between workouts.      •Protect your knees. Use kneepads when you kneel on a hard surface and when you play sports. Stand and walk around every 20 minutes if you have to kneel for a long period of time.      Follow up with your doctor as directed: Write down your questions so you remember to ask them during your visits

## 2022-01-02 NOTE — ED PROVIDER NOTE - NSICDXPASTMEDICALHX_GEN_ALL_CORE_FT
PAST MEDICAL HISTORY:  Bronchitis     Chlamydia     Crohn's disease     Gonorrhea     HIV (human immunodeficiency virus infection)     HTN (hypertension)     Syphilis

## 2022-01-02 NOTE — CONSULT NOTE ADULT - SUBJECTIVE AND OBJECTIVE BOX
Orthopaedic Surgery Consult Note    Attending Physician: Moy  Consult requested by: ED    CC: R knee pain    HPI:  51 M pmh HIV on haart, htn transfer from [facility] for R knee pain s/p injury.  accidentally knelt down onto glass sustaining wound over patella, now w/ swelling and pain to knee and purulent dc from wound since yesterday.  Seen at [facility], xray no FB and elevated crp 62.  As per note recommended transfer by ortho Dr. Winter.  Denies fevers/chills, headache, dizziness, chest pain, sob, nv, numbness/weakness, limited ROM joints, calf pain/swelling, other complaints    Allergies    No Known Allergies    Intolerances      PAST MEDICAL & SURGICAL HISTORY:  HTN (hypertension)    Crohn&#x27;s disease    HIV (human immunodeficiency virus infection)    Bronchitis    Chlamydia    Gonorrhea    Syphilis    H/O colectomy    H/O hernia repair        Meds:      Family History:  Denies family history of bleeding disorders    Social History:   Pt is a nonsmoker  Social EtOH use     Review of Systems:  All review of systems are negative except for those mentioned in HPI.    Physical Exam:  General: Pt Alert and oriented, NAD  R knee mildly swollen over infrapatellar burse with small wound over superior pole of patella, not able to express any pus or drainage.  Full pain free arc ROM passive and active at the knee  Pulses: 2+ dp, pt pulses, toes wwp, cap refill <3 seconds  Sensation: SILT sural/saph/sp/dp/ tibial distributions  Motor: EHL/FHL/TA/GS 5/5    Vital Signs Last 24 Hrs  T(C): 36.4 (02 Jan 2022 10:46), Max: 37.1 (02 Jan 2022 07:12)  T(F): 97.5 (02 Jan 2022 10:46), Max: 98.7 (02 Jan 2022 07:12)  HR: 80 (02 Jan 2022 10:46) (80 - 97)  BP: 147/85 (02 Jan 2022 10:46) (104/57 - 147/85)  BP(mean): --  RR: 18 (02 Jan 2022 10:46) (18 - 20)  SpO2: 98% (02 Jan 2022 10:46) (98% - 99%)      Labs:                        11.9   8.41  )-----------( 327      ( 02 Jan 2022 02:58 )             37.7     01-02    136  |  101  |  24<H>  ----------------------------<  86  4.8   |  29  |  1.70<H>    Ca    8.6      02 Jan 2022 02:58    TPro  7.6  /  Alb  3.5  /  TBili  0.2  /  DBili  x   /  AST  32  /  ALT  30  /  AlkPhos  82  01-02    CRP ([facility]): 62    Imaging:   R knee Xray (ap/lateral/oblique/sunrise): No evidence of foreign bodies, fracture, dislocation or effusion.    A/P: 51yMale with R knee pain/swelling, concern for bursitis, more so than septic arthritis. Knee aspiration attempted x2 which yielded a dry tap.  - stable, afebrile  - WBC wnl, CRP 62, ESR pending  - no concern for septic arthritis at this time given dry tap, most likely infrapatellar bursitis  - no foreign bodies appreciated on exam or imaging  - discharge with PO bactrim x 7-10 days  - f/u with Moy outpatient  - return to ED if develop fever/chills or worsening R knee pain/swelling  - WBS: WBAT RLE  - Discussed with Attending, Dr. Moy Seals, PGY-1  Ortho Pager [phone]   Orthopaedic Surgery Consult Note    Attending Physician: Moy  Consult requested by: ED    CC: R knee pain    HPI:  51 M pmh HIV on haart, htn transfer from [facility] for R knee pain s/p injury.  accidentally knelt down onto glass sustaining wound over patella, now w/ swelling and pain to knee and purulent dc from wound since yesterday.  Seen at [facility], xray no FB and elevated crp 62.  As per note recommended transfer by ortho Dr. Winter.  Denies fevers/chills, headache, dizziness, chest pain, sob, nv, numbness/weakness, limited ROM joints, calf pain/swelling, other complaints    Allergies    No Known Allergies    Intolerances      PAST MEDICAL & SURGICAL HISTORY:  HTN (hypertension)    Crohn&#x27;s disease    HIV (human immunodeficiency virus infection)    Bronchitis    Chlamydia    Gonorrhea    Syphilis    H/O colectomy    H/O hernia repair        Meds:      Family History:  Denies family history of bleeding disorders    Social History:   Pt is a nonsmoker  Social EtOH use     Review of Systems:  All review of systems are negative except for those mentioned in HPI.    Physical Exam:  General: Pt Alert and oriented, NAD  R knee mildly swollen over infrapatellar burse with small wound over superior pole of patella, not able to express any pus or drainage.  Full pain free arc ROM passive and active at the knee  Pulses: 2+ dp, pt pulses, toes wwp, cap refill <3 seconds  Sensation: SILT sural/saph/sp/dp/ tibial distributions  Motor: EHL/FHL/TA/GS 5/5    Vital Signs Last 24 Hrs  T(C): 36.4 (02 Jan 2022 10:46), Max: 37.1 (02 Jan 2022 07:12)  T(F): 97.5 (02 Jan 2022 10:46), Max: 98.7 (02 Jan 2022 07:12)  HR: 80 (02 Jan 2022 10:46) (80 - 97)  BP: 147/85 (02 Jan 2022 10:46) (104/57 - 147/85)  BP(mean): --  RR: 18 (02 Jan 2022 10:46) (18 - 20)  SpO2: 98% (02 Jan 2022 10:46) (98% - 99%)      Labs:                        11.9   8.41  )-----------( 327      ( 02 Jan 2022 02:58 )             37.7     01-02    136  |  101  |  24<H>  ----------------------------<  86  4.8   |  29  |  1.70<H>    Ca    8.6      02 Jan 2022 02:58    TPro  7.6  /  Alb  3.5  /  TBili  0.2  /  DBili  x   /  AST  32  /  ALT  30  /  AlkPhos  82  01-02    CRP ([facility]): 62    Imaging:   R knee Xray (ap/lateral/oblique/sunrise): No evidence of foreign bodies, fracture, dislocation or effusion.    A/P: 51yMale with R knee pain/swelling, concern for bursitis, more so than septic arthritis. Knee aspiration attempted x2 which yielded a dry tap.  - stable, afebrile  - WBC wnl, CRP 62, ESR pending  - no concern for septic arthritis at this time given dry tap, most likely infrapatellar bursitis  - no foreign bodies appreciated on exam or imaging  - discharge with PO bactrim x10-14 days  - f/u with Moy outpatient  - return to ED if develop fever/chills or worsening R knee pain/swelling  - WBS: WBAT RLE  - Discussed with Attending, Dr. Moy Seals, PGY-1  Ortho Pager [phone]

## 2022-01-02 NOTE — ED PROVIDER NOTE - MUSCULOSKELETAL, MLM
Right Knee: (+) swelling, warmth to the skin. (+) joint effusion. Full ROM of knee joint, (-) skin erythema, open laceration or active bleeding. (-) lymphatic streaking. Right Knee: (+) swelling, warmth to the skin. (+) joint effusion. (+) active pus draining from knee. Full ROM of knee joint, (-) skin erythema, open laceration or active bleeding. (-) lymphatic streaking.

## 2022-01-02 NOTE — ED ADULT NURSE NOTE - OBJECTIVE STATEMENT
Pt. sent from [facility] for ortho consult. C/O knee swelling and pain. COVID +. Ambulatory around ED w/o gait disturbance.

## 2022-01-02 NOTE — ED PROVIDER NOTE - OBJECTIVE STATEMENT
50 yo M presents with right knee pain and swelling x yesterday. Patient states he was kneeling down and may have kneeling onto glass. Denies active bleeding. Now the pain is traveling up his thigh. The pain is worse when he doesn't move his knee for a while & improves with movement.    Denies fevers, chills, skin discoloration or direct trauma.

## 2022-01-02 NOTE — ED PROVIDER NOTE - PATIENT PORTAL LINK FT
You can access the FollowMyHealth Patient Portal offered by [facility] by registering at the following website: [email or web address]. By joining [employer]’s FollowMyHealth portal, you will also be able to view your health information using other applications (apps) compatible with our system.

## 2022-01-10 NOTE — ED POST DISCHARGE NOTE - RESULT SUMMARY
+rpr, rx'ed doxycycline, patient needs to be notified of results. left VM for patient to call back Detail Level: Detailed

## 2023-03-07 ENCOUNTER — EMERGENCY (EMERGENCY)
Facility: HOSPITAL | Age: 53
LOS: 1 days | Discharge: HOME | End: 2023-03-07
Attending: EMERGENCY MEDICINE | Admitting: EMERGENCY MEDICINE
Payer: MEDICAID

## 2023-03-07 VITALS
SYSTOLIC BLOOD PRESSURE: 133 MMHG | DIASTOLIC BLOOD PRESSURE: 85 MMHG | OXYGEN SATURATION: 100 % | TEMPERATURE: 98 F | WEIGHT: 164.91 LBS | HEART RATE: 101 BPM | RESPIRATION RATE: 19 BRPM | HEIGHT: 69 IN

## 2023-03-07 DIAGNOSIS — M79.601 PAIN IN RIGHT ARM: ICD-10-CM

## 2023-03-07 DIAGNOSIS — F17.200 NICOTINE DEPENDENCE, UNSPECIFIED, UNCOMPLICATED: ICD-10-CM

## 2023-03-07 DIAGNOSIS — M47.816 SPONDYLOSIS WITHOUT MYELOPATHY OR RADICULOPATHY, LUMBAR REGION: ICD-10-CM

## 2023-03-07 DIAGNOSIS — B20 HUMAN IMMUNODEFICIENCY VIRUS [HIV] DISEASE: ICD-10-CM

## 2023-03-07 DIAGNOSIS — Z90.49 ACQUIRED ABSENCE OF OTHER SPECIFIED PARTS OF DIGESTIVE TRACT: Chronic | ICD-10-CM

## 2023-03-07 DIAGNOSIS — Z87.440 PERSONAL HISTORY OF URINARY (TRACT) INFECTIONS: ICD-10-CM

## 2023-03-07 DIAGNOSIS — Z87.19 PERSONAL HISTORY OF OTHER DISEASES OF THE DIGESTIVE SYSTEM: ICD-10-CM

## 2023-03-07 DIAGNOSIS — M54.50 LOW BACK PAIN, UNSPECIFIED: ICD-10-CM

## 2023-03-07 DIAGNOSIS — I10 ESSENTIAL (PRIMARY) HYPERTENSION: ICD-10-CM

## 2023-03-07 DIAGNOSIS — Z98.890 OTHER SPECIFIED POSTPROCEDURAL STATES: Chronic | ICD-10-CM

## 2023-03-07 PROCEDURE — 99284 EMERGENCY DEPT VISIT MOD MDM: CPT

## 2023-03-07 NOTE — ED ADULT NURSE NOTE - NSIMPLEMENTINTERV_GEN_ALL_ED
Implemented All Universal Safety Interventions:  [place] to call system. Call bell, personal items and telephone within reach. Instruct patient to call for assistance. Room bathroom lighting operational. Non-slip footwear when patient is off stretcher. Physically safe environment: no spills, clutter or unnecessary equipment. Stretcher in lowest position, wheels locked, appropriate side rails in place.

## 2023-03-07 NOTE — ED ADULT NURSE NOTE - OBJECTIVE STATEMENT
Pt asleep on assessment, arouses with verbal stimulation. Pt with inconsistent story and history. Reports back pain and taking keflex in January for it, when asked what abx was for pt then states UTI. When asked about current UTI sx pt then states numbness to right back. Pt then stating he is having pain only and no numbness. Pt denies dysuria. Pt given urine cup. No meds taken for sx, abx in January.

## 2023-03-07 NOTE — ED ADULT TRIAGE NOTE - CHIEF COMPLAINT QUOTE
Pt presents to ed reporting lower back pain x few weeks. also endorsing pain to right side of chest that radiated to arm earlier today, described as "charley horse". no ha/vision loss/no pronator drift. equal  strength bilaterally. denies numbness/tingling to legs

## 2023-03-08 VITALS
DIASTOLIC BLOOD PRESSURE: 91 MMHG | HEART RATE: 73 BPM | TEMPERATURE: 98 F | OXYGEN SATURATION: 100 % | RESPIRATION RATE: 15 BRPM | SYSTOLIC BLOOD PRESSURE: 145 MMHG

## 2023-03-08 LAB
ALBUMIN SERPL ELPH-MCNC: 3.2 G/DL — LOW (ref 3.4–5)
ALP SERPL-CCNC: 80 U/L — SIGNIFICANT CHANGE UP (ref 40–120)
ALT FLD-CCNC: 30 U/L — SIGNIFICANT CHANGE UP (ref 12–42)
ANION GAP SERPL CALC-SCNC: 6 MMOL/L — LOW (ref 9–16)
APTT BLD: 30.3 SEC — SIGNIFICANT CHANGE UP (ref 27.5–35.5)
AST SERPL-CCNC: 22 U/L — SIGNIFICANT CHANGE UP (ref 15–37)
BASOPHILS # BLD AUTO: 0.03 K/UL — SIGNIFICANT CHANGE UP (ref 0–0.2)
BASOPHILS NFR BLD AUTO: 0.5 % — SIGNIFICANT CHANGE UP (ref 0–2)
BILIRUB SERPL-MCNC: 0.3 MG/DL — SIGNIFICANT CHANGE UP (ref 0.2–1.2)
BUN SERPL-MCNC: 14 MG/DL — SIGNIFICANT CHANGE UP (ref 7–23)
CALCIUM SERPL-MCNC: 9 MG/DL — SIGNIFICANT CHANGE UP (ref 8.5–10.5)
CHLORIDE SERPL-SCNC: 109 MMOL/L — HIGH (ref 96–108)
CO2 SERPL-SCNC: 28 MMOL/L — SIGNIFICANT CHANGE UP (ref 22–31)
CREAT SERPL-MCNC: 1.55 MG/DL — HIGH (ref 0.5–1.3)
EGFR: 54 ML/MIN/1.73M2 — LOW
EOSINOPHIL # BLD AUTO: 0.18 K/UL — SIGNIFICANT CHANGE UP (ref 0–0.5)
EOSINOPHIL NFR BLD AUTO: 3.2 % — SIGNIFICANT CHANGE UP (ref 0–6)
GLUCOSE SERPL-MCNC: 98 MG/DL — SIGNIFICANT CHANGE UP (ref 70–99)
HCT VFR BLD CALC: 42.7 % — SIGNIFICANT CHANGE UP (ref 39–50)
HGB BLD-MCNC: 13.2 G/DL — SIGNIFICANT CHANGE UP (ref 13–17)
IMM GRANULOCYTES NFR BLD AUTO: 0.2 % — SIGNIFICANT CHANGE UP (ref 0–0.9)
INR BLD: 1.03 — SIGNIFICANT CHANGE UP (ref 0.88–1.16)
LIDOCAIN IGE QN: 139 U/L — SIGNIFICANT CHANGE UP (ref 73–393)
LYMPHOCYTES # BLD AUTO: 1.81 K/UL — SIGNIFICANT CHANGE UP (ref 1–3.3)
LYMPHOCYTES # BLD AUTO: 32.1 % — SIGNIFICANT CHANGE UP (ref 13–44)
MAGNESIUM SERPL-MCNC: 1.7 MG/DL — SIGNIFICANT CHANGE UP (ref 1.6–2.6)
MCHC RBC-ENTMCNC: 29.1 PG — SIGNIFICANT CHANGE UP (ref 27–34)
MCHC RBC-ENTMCNC: 30.9 GM/DL — LOW (ref 32–36)
MCV RBC AUTO: 94.1 FL — SIGNIFICANT CHANGE UP (ref 80–100)
MONOCYTES # BLD AUTO: 0.52 K/UL — SIGNIFICANT CHANGE UP (ref 0–0.9)
MONOCYTES NFR BLD AUTO: 9.2 % — SIGNIFICANT CHANGE UP (ref 2–14)
NEUTROPHILS # BLD AUTO: 3.09 K/UL — SIGNIFICANT CHANGE UP (ref 1.8–7.4)
NEUTROPHILS NFR BLD AUTO: 54.8 % — SIGNIFICANT CHANGE UP (ref 43–77)
NRBC # BLD: 0 /100 WBCS — SIGNIFICANT CHANGE UP (ref 0–0)
PLATELET # BLD AUTO: 339 K/UL — SIGNIFICANT CHANGE UP (ref 150–400)
POTASSIUM SERPL-MCNC: 4.9 MMOL/L — SIGNIFICANT CHANGE UP (ref 3.5–5.3)
POTASSIUM SERPL-SCNC: 4.9 MMOL/L — SIGNIFICANT CHANGE UP (ref 3.5–5.3)
PROT SERPL-MCNC: 7.2 G/DL — SIGNIFICANT CHANGE UP (ref 6.4–8.2)
PROTHROM AB SERPL-ACNC: 12.1 SEC — SIGNIFICANT CHANGE UP (ref 10.5–13.4)
RBC # BLD: 4.54 M/UL — SIGNIFICANT CHANGE UP (ref 4.2–5.8)
RBC # FLD: 14 % — SIGNIFICANT CHANGE UP (ref 10.3–14.5)
SODIUM SERPL-SCNC: 143 MMOL/L — SIGNIFICANT CHANGE UP (ref 132–145)
TROPONIN I, HIGH SENSITIVITY RESULT: 26.5 NG/L — SIGNIFICANT CHANGE UP
WBC # BLD: 5.64 K/UL — SIGNIFICANT CHANGE UP (ref 3.8–10.5)
WBC # FLD AUTO: 5.64 K/UL — SIGNIFICANT CHANGE UP (ref 3.8–10.5)

## 2023-03-08 PROCEDURE — 72131 CT LUMBAR SPINE W/O DYE: CPT | Mod: 26

## 2023-03-08 PROCEDURE — 71046 X-RAY EXAM CHEST 2 VIEWS: CPT | Mod: 26

## 2023-03-08 RX ORDER — CYCLOBENZAPRINE HYDROCHLORIDE 10 MG/1
10 TABLET, FILM COATED ORAL ONCE
Refills: 0 | Status: COMPLETED | OUTPATIENT
Start: 2023-03-08 | End: 2023-03-08

## 2023-03-08 RX ORDER — KETOROLAC TROMETHAMINE 30 MG/ML
15 SYRINGE (ML) INJECTION ONCE
Refills: 0 | Status: DISCONTINUED | OUTPATIENT
Start: 2023-03-08 | End: 2023-03-08

## 2023-03-08 RX ORDER — IBUPROFEN 200 MG
1 TABLET ORAL
Qty: 28 | Refills: 0
Start: 2023-03-08 | End: 2023-03-14

## 2023-03-08 RX ORDER — CYCLOBENZAPRINE HYDROCHLORIDE 10 MG/1
1 TABLET, FILM COATED ORAL
Qty: 21 | Refills: 0
Start: 2023-03-08 | End: 2023-03-14

## 2023-03-08 RX ADMIN — Medication 15 MILLIGRAM(S): at 06:17

## 2023-03-08 RX ADMIN — CYCLOBENZAPRINE HYDROCHLORIDE 10 MILLIGRAM(S): 10 TABLET, FILM COATED ORAL at 06:17

## 2023-03-08 RX ADMIN — Medication 15 MILLIGRAM(S): at 03:25

## 2023-03-08 NOTE — ED PROVIDER NOTE - CAPILLARY REFILL
Multiple regions of discoloration puncture wounds to bilateral arms consistent with track marks from IV drug abuse/less than 2 seconds

## 2023-03-08 NOTE — ED PROVIDER NOTE - CARE PLAN
1 Principal Discharge DX:	Lower back pain   Principal Discharge DX:	Degenerative joint disease (DJD) of lumbar spine

## 2023-03-08 NOTE — ED PROVIDER NOTE - PATIENT PORTAL LINK FT
You can access the FollowMyHealth Patient Portal offered by [facility] by registering at the following website: [email or web address]. By joining [employer]’s FollowMyHealth portal, you will also be able to view your health information using other applications (apps) compatible with our system.

## 2023-03-08 NOTE — ED PROVIDER NOTE - OBJECTIVE STATEMENT
52-year-old male history of HIV well-controlled history of IVDA, hypertension, Crohn's colitis status post complete colectomy when he was 30 years old and otherwise has not had a flare since, status post UTI 1 month ago at which time he was treated with antibiotics, who presents with dull lower back pain which radiates into the right side and up the right arm and per patient feels like he is having a charley horse of his right arm.  He denies radiation into the chest, denies shortness of breath, palpitations, denies syncope, numbness paresthesias or focal weakness.  Denies fever chills cough, flank pain, denies dysuria or hematuria.  Denies new sexual partners or risk for an STD.  He was last tested 1 month ago with a UTI and it was negative.

## 2023-03-08 NOTE — ED PROVIDER NOTE - CLINICAL SUMMARY MEDICAL DECISION MAKING FREE TEXT BOX
Patient with a history of HIV well-controlled, Crohn's colitis, no active flares in several years, intermittent smoker IV drug abuser, who presents with lower back pain right greater than the left which sometimes radiates into the right upper extremity patient has no focal neurological deficits on exam he has no midline spinal tenderness ambulatory with no bowel or bladder symptoms, no numbness no weakness.  My suspicion for spinal abscess is low, will check basic labs, will start treatment with Toradol for pain, do a CT L-spine, reassess after analgesics labs and urine.

## 2023-03-08 NOTE — ED PROVIDER NOTE - PROGRESS NOTE DETAILS
Patient pain improved with Toradol we will give another round of Toradol as well as cyclobenzaprine.  Results of CT L-spine were discussed with patient in detail.  Although he has findings back in 2020 he has new findings now which could be indicative of discitis or inflammatory infection of the L-spine.  Patient understands his results but he would like to be discharged as he currently follows outpatient and is not able to be admitted at this time secondary to social situation.  I advised that we will give a referral for spine and he can continue with his care primary at [facility] but should see spine as soon as possible at [facility] for MRI outpatient and further evaluation.  Should he start to develop any numbness paresthesias weakness to the legs bowel or bladder symptoms he should return to the emergency department as soon as possible.  I discussed the since he is actively using IV drugs this could be a risk factor for spinal infection, spinal abscess, etc.  Patient agrees with this plan he has no questions he will return if his pain is not controlled or he develops worsening symptoms

## 2023-03-08 NOTE — ED PROVIDER NOTE - CARE PROVIDER_API CALL
Patrick Cordero)  Neurosurgery  [address] [address]  Phone: [phone]  Fax: [phone]  Follow Up Time:

## 2023-07-08 ENCOUNTER — EMERGENCY (EMERGENCY)
Facility: HOSPITAL | Age: 53
LOS: 1 days | Discharge: HOME | End: 2023-07-08
Attending: EMERGENCY MEDICINE | Admitting: EMERGENCY MEDICINE
Payer: MEDICAID

## 2023-07-08 VITALS
TEMPERATURE: 98 F | HEIGHT: 69 IN | OXYGEN SATURATION: 98 % | WEIGHT: 164.91 LBS | RESPIRATION RATE: 15 BRPM | HEART RATE: 94 BPM | DIASTOLIC BLOOD PRESSURE: 107 MMHG | SYSTOLIC BLOOD PRESSURE: 156 MMHG

## 2023-07-08 VITALS
SYSTOLIC BLOOD PRESSURE: 143 MMHG | OXYGEN SATURATION: 98 % | DIASTOLIC BLOOD PRESSURE: 84 MMHG | RESPIRATION RATE: 18 BRPM | HEART RATE: 69 BPM | TEMPERATURE: 98 F

## 2023-07-08 DIAGNOSIS — Z90.49 ACQUIRED ABSENCE OF OTHER SPECIFIED PARTS OF DIGESTIVE TRACT: Chronic | ICD-10-CM

## 2023-07-08 DIAGNOSIS — Z98.890 OTHER SPECIFIED POSTPROCEDURAL STATES: Chronic | ICD-10-CM

## 2023-07-08 PROCEDURE — 99284 EMERGENCY DEPT VISIT MOD MDM: CPT

## 2023-07-08 PROCEDURE — 73130 X-RAY EXAM OF HAND: CPT | Mod: 26,LT

## 2023-07-08 RX ORDER — CEFPODOXIME PROXETIL 100 MG
1 TABLET ORAL
Qty: 20 | Refills: 0
Start: 2023-07-08 | End: 2023-07-17

## 2023-07-08 RX ORDER — OXYCODONE HYDROCHLORIDE 5 MG/1
5 TABLET ORAL ONCE
Refills: 0 | Status: DISCONTINUED | OUTPATIENT
Start: 2023-07-08 | End: 2023-07-08

## 2023-07-08 RX ORDER — IBUPROFEN 200 MG
600 TABLET ORAL ONCE
Refills: 0 | Status: COMPLETED | OUTPATIENT
Start: 2023-07-08 | End: 2023-07-08

## 2023-07-08 RX ORDER — TETANUS TOXOID, REDUCED DIPHTHERIA TOXOID AND ACELLULAR PERTUSSIS VACCINE, ADSORBED 5; 2.5; 8; 8; 2.5 [IU]/.5ML; [IU]/.5ML; UG/.5ML; UG/.5ML; UG/.5ML
0.5 SUSPENSION INTRAMUSCULAR ONCE
Refills: 0 | Status: COMPLETED | OUTPATIENT
Start: 2023-07-08 | End: 2023-07-08

## 2023-07-08 RX ORDER — CEFPODOXIME PROXETIL 100 MG
100 TABLET ORAL ONCE
Refills: 0 | Status: COMPLETED | OUTPATIENT
Start: 2023-07-08 | End: 2023-07-08

## 2023-07-08 RX ADMIN — Medication 600 MILLIGRAM(S): at 04:26

## 2023-07-08 RX ADMIN — Medication 100 MILLIGRAM(S): at 02:57

## 2023-07-08 RX ADMIN — OXYCODONE HYDROCHLORIDE 5 MILLIGRAM(S): 5 TABLET ORAL at 06:12

## 2023-07-08 RX ADMIN — OXYCODONE HYDROCHLORIDE 5 MILLIGRAM(S): 5 TABLET ORAL at 04:25

## 2023-07-08 RX ADMIN — Medication 600 MILLIGRAM(S): at 06:12

## 2023-07-08 RX ADMIN — TETANUS TOXOID, REDUCED DIPHTHERIA TOXOID AND ACELLULAR PERTUSSIS VACCINE, ADSORBED 0.5 MILLILITER(S): 5; 2.5; 8; 8; 2.5 SUSPENSION INTRAMUSCULAR at 02:57

## 2023-07-08 NOTE — ED PROVIDER NOTE - OBJECTIVE STATEMENT
53-year-old male with history of HIV presents with puncture wound to the left hand.  Patient states that he was trying to care treatment with a screwdriver when it slipped and went through the palmar aspect of his hand.  He states it went to approximately 2-4 inches. 12 hours prior to arrival.  Patient is not to sleep comfortably so decided to come to the hospital for assistance.  He also tells me that he cannot stay because he needs to block his friend's dog

## 2023-07-08 NOTE — ED PROVIDER NOTE - CLINICAL SUMMARY MEDICAL DECISION MAKING FREE TEXT BOX
53-year-old male with history of HIV presents with puncture wound to the left hand.  Patient states that he was trying to care treatment with a screwdriver when it slipped and went through the palmar aspect of his hand.  He states it went to approximately 2-4 inches. 12 hours prior to arrival.  Patient is not to sleep comfortably so decided to come to the hospital for assistance.  He also tells me that he cannot stay because he needs to block his friend's dog    PE: Well-appearing, no acute distress, nonlabored respirations.  L hand: small puncture wound to the lateral aspect of the palmar hand without signs of erythema/drainage/swelling. TTP of the medial aspect of the palmar hand. FROM of the fingers/wrist. SILT. <2 sec cap refill.     MDM: Patient with puncture wound to the L hand. Concern for deep space infection to develop given hx. Would like to do labs and IV abx, but patient states that he needs to walk his friend's dog. Is amenable to XR, oral abx, and pain meds.

## 2023-07-08 NOTE — ED ADULT TRIAGE NOTE - CHIEF COMPLAINT QUOTE
"I was fixing my clippers, using a screwdriver and it went into my hand approximately 4 inches and I pulled it out." Pt noted to have a puncture wound, swelling and pain to the left hand. States this happened around 3 pm, had fallen asleep tonight and woke up in a lot of pain. No bleeding noted. Needs tetanus vaccine.

## 2023-07-08 NOTE — ED PROVIDER NOTE - PHYSICAL EXAMINATION
PE: Well-appearing, no acute distress, nonlabored respirations.  L hand: small puncture wound to the lateral aspect of the palmar hand without signs of erythema/drainage/swelling. TTP of the medial aspect of the palmar hand. FROM of the fingers/wrist. SILT. <2 sec cap refill.

## 2023-07-08 NOTE — ED ADULT NURSE NOTE - NSFALLUNIVINTERV_ED_ALL_ED
Bed/Stretcher in lowest position, wheels locked, appropriate side rails in place/Call bell, personal items and telephone in reach/Instruct patient to call for assistance before getting out of bed/chair/stretcher/Non-slip footwear applied when patient is off stretcher/[place] to call system/Physically safe environment - no spills, clutter or unnecessary equipment/Purposeful proactive rounding/Room/bathroom lighting operational, light cord in reach

## 2023-07-08 NOTE — ED PROVIDER NOTE - CARE PROVIDER_API CALL
Alfredo Bowling  Plastic Surgery  [address], [address]  [place] [address]  Phone: [phone]  Fax: [phone]  Follow Up Time: 1-3 Days

## 2023-07-08 NOTE — ED PROVIDER NOTE - PATIENT PORTAL LINK FT
You can access the FollowMyHealth Patient Portal offered by [facility] by registering at the following website: [email or web address]. By joining [employer]’s FollowMyHealth portal, you will also be able to view your health information using other applications (apps) compatible with our system.

## 2023-07-08 NOTE — ED PROVIDER NOTE - NSFOLLOWUPINSTRUCTIONS_ED_ALL_ED_FT
Puncture Wound    WHAT YOU NEED TO KNOW:    A puncture wound is a hole in the skin made by a sharp, pointed object.         DISCHARGE INSTRUCTIONS:    Return to the emergency department if:     You have severe pain.      You have numbness or tingling in the area of your wound.      Your wound starts bleeding and does not stop, even after you apply pressure.    Contact your healthcare provider if:     You have new drainage or a bad odor coming from the wound.      You have a fever.      You have increased swelling, redness, or pain.      You have red streaks on your skin coming from your wound.      You have questions or concerns about your condition or care.    Medicines: You may need any of the following:    NSAIDs, such as ibuprofen, help decrease swelling, pain, and fever. This medicine is available with or without a doctor's order. NSAIDs can cause stomach bleeding or kidney problems in certain people. If you take blood thinner medicine, always ask your healthcare provider if NSAIDs are safe for you. Always read the medicine label and follow directions.      Antibiotics help treat a bacterial infection.       Take your medicine as directed. Contact your healthcare provider if you think your medicine is not helping or if you have side effects. Tell him of her if you are allergic to any medicine. Keep a list of the medicines, vitamins, and herbs you take. Include the amounts, and when and why you take them. Bring the list or the pill bottles to follow-up visits. Carry your medicine list with you in case of an emergency.    Wound care: Keep your wound clean and dry. When you are allowed to bathe, carefully wash the wound with soap and water. Dry the area and put on new, clean bandages as directed. Change your bandages when they get wet or dirty.    Manage your symptoms:     Rest your injured area as much as possible. If the puncture wound is in your leg or foot, use crutches as directed. This will help keep the weight off your injured leg or foot as it heals.       Elevate your injured area above the level of your heart as often as you can. This will help decrease swelling and pain. Prop your injured area on pillows or blankets to keep it elevated comfortably.     Follow up with your healthcare provider in 2 to 3 days: Write down your questions so you remember to ask them during your visits.       © Copyright [employer] 2019 All illustrations and images included in CareNotes are the copyrighted property of [employer] or "[employer]. You must be very careful and vigilant with this hand injury. There is a high chance of infection, so take your prescriptions as directed. Seek immediate medical care for any new/worsening signs or symptoms. Follow up with the hand surgeon as listed below. Please read all handouts provided to you from the emergency department.  You may take ibuprofen 600 mg every 6 hours and/or acetaminophen 650 mg every 4-6 hours as needed for pain.     Puncture Wound    WHAT YOU NEED TO KNOW:    A puncture wound is a hole in the skin made by a sharp, pointed object.         DISCHARGE INSTRUCTIONS:    Return to the emergency department if:     You have severe pain.      You have numbness or tingling in the area of your wound.      Your wound starts bleeding and does not stop, even after you apply pressure.    Contact your healthcare provider if:     You have new drainage or a bad odor coming from the wound.      You have a fever.      You have increased swelling, redness, or pain.      You have red streaks on your skin coming from your wound.      You have questions or concerns about your condition or care.    Medicines: You may need any of the following:    NSAIDs, such as ibuprofen, help decrease swelling, pain, and fever. This medicine is available with or without a doctor's order. NSAIDs can cause stomach bleeding or kidney problems in certain people. If you take blood thinner medicine, always ask your healthcare provider if NSAIDs are safe for you. Always read the medicine label and follow directions.      Antibiotics help treat a bacterial infection.       Take your medicine as directed. Contact your healthcare provider if you think your medicine is not helping or if you have side effects. Tell him of her if you are allergic to any medicine. Keep a list of the medicines, vitamins, and herbs you take. Include the amounts, and when and why you take them. Bring the list or the pill bottles to follow-up visits. Carry your medicine list with you in case of an emergency.    Wound care: Keep your wound clean and dry. When you are allowed to bathe, carefully wash the wound with soap and water. Dry the area and put on new, clean bandages as directed. Change your bandages when they get wet or dirty.    Manage your symptoms:     Rest your injured area as much as possible. If the puncture wound is in your leg or foot, use crutches as directed. This will help keep the weight off your injured leg or foot as it heals.       Elevate your injured area above the level of your heart as often as you can. This will help decrease swelling and pain. Prop your injured area on pillows or blankets to keep it elevated comfortably.     Follow up with your healthcare provider in 2 to 3 days: Write down your questions so you remember to ask them during your visits.       © Copyright [employer] 2019 All illustrations and images included in CareNotes are the copyrighted property of [employer] or [employer].

## 2023-07-08 NOTE — ED PROVIDER NOTE - PROGRESS NOTE DETAILS
Hand irrigated with betadine solution Conservative management discussed with the patient in detail.  Close PMD follow up encouraged.  Strict ED return instructions discussed in detail and patient given the opportunity to ask any questions about their discharge diagnosis and instructions

## 2023-07-11 DIAGNOSIS — S61.432A PUNCTURE WOUND WITHOUT FOREIGN BODY OF LEFT HAND, INITIAL ENCOUNTER: ICD-10-CM
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
## 2023-07-11 DIAGNOSIS — B20 HUMAN IMMUNODEFICIENCY VIRUS [HIV] DISEASE: ICD-10-CM

## 2023-07-11 DIAGNOSIS — Z23 ENCOUNTER FOR IMMUNIZATION: ICD-10-CM
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
